# Patient Record
Sex: FEMALE | Race: OTHER | HISPANIC OR LATINO | Employment: UNEMPLOYED | URBAN - METROPOLITAN AREA
[De-identification: names, ages, dates, MRNs, and addresses within clinical notes are randomized per-mention and may not be internally consistent; named-entity substitution may affect disease eponyms.]

---

## 2017-03-03 ENCOUNTER — HOSPITAL ENCOUNTER (EMERGENCY)
Facility: HOSPITAL | Age: 27
Discharge: HOME | End: 2017-03-03
Attending: EMERGENCY MEDICINE

## 2017-03-03 VITALS
TEMPERATURE: 99 F | SYSTOLIC BLOOD PRESSURE: 120 MMHG | RESPIRATION RATE: 20 BRPM | DIASTOLIC BLOOD PRESSURE: 82 MMHG | WEIGHT: 154 LBS | HEIGHT: 63 IN | HEART RATE: 80 BPM | BODY MASS INDEX: 27.29 KG/M2 | OXYGEN SATURATION: 99 %

## 2017-03-03 DIAGNOSIS — R10.2 PELVIC PAIN AFFECTING PREGNANCY IN FIRST TRIMESTER, ANTEPARTUM: ICD-10-CM

## 2017-03-03 DIAGNOSIS — O34.01 SEPTATE UTERUS AFFECTING PREGNANCY IN FIRST TRIMESTER: ICD-10-CM

## 2017-03-03 DIAGNOSIS — Q51.28 SEPTATE UTERUS AFFECTING PREGNANCY IN FIRST TRIMESTER: ICD-10-CM

## 2017-03-03 DIAGNOSIS — Z3A.01 LESS THAN 8 WEEKS GESTATION OF PREGNANCY: Primary | ICD-10-CM

## 2017-03-03 DIAGNOSIS — O26.891 PELVIC PAIN AFFECTING PREGNANCY IN FIRST TRIMESTER, ANTEPARTUM: ICD-10-CM

## 2017-03-03 LAB
ALBUMIN SERPL BCP-MCNC: 3.8 G/DL
ALP SERPL-CCNC: 71 U/L
ALT SERPL W/O P-5'-P-CCNC: 14 U/L
ANION GAP SERPL CALC-SCNC: 9 MMOL/L
AST SERPL-CCNC: 14 U/L
B-HCG UR QL: POSITIVE
BACTERIA GENITAL QL WET PREP: ABNORMAL
BASOPHILS # BLD AUTO: 0.02 K/UL
BASOPHILS NFR BLD: 0.3 %
BILIRUB SERPL-MCNC: 0.7 MG/DL
BILIRUB UR QL STRIP: NEGATIVE
BUN SERPL-MCNC: 10 MG/DL
CALCIUM SERPL-MCNC: 9.5 MG/DL
CHLORIDE SERPL-SCNC: 105 MMOL/L
CLARITY UR: CLEAR
CLUE CELLS VAG QL WET PREP: ABNORMAL
CO2 SERPL-SCNC: 21 MMOL/L
COLOR UR: YELLOW
CREAT SERPL-MCNC: 0.7 MG/DL
CTP QC/QA: YES
DIFFERENTIAL METHOD: NORMAL
EOSINOPHIL # BLD AUTO: 0.1 K/UL
EOSINOPHIL NFR BLD: 1.6 %
ERYTHROCYTE [DISTWIDTH] IN BLOOD BY AUTOMATED COUNT: 12.4 %
EST. GFR  (AFRICAN AMERICAN): >60 ML/MIN/1.73 M^2
EST. GFR  (NON AFRICAN AMERICAN): >60 ML/MIN/1.73 M^2
FILAMENT FUNGI VAG WET PREP-#/AREA: ABNORMAL
GLUCOSE SERPL-MCNC: 99 MG/DL
GLUCOSE UR QL STRIP: NEGATIVE
HCG INTACT+B SERPL-ACNC: NORMAL MIU/ML
HCT VFR BLD AUTO: 39.4 %
HGB BLD-MCNC: 13.5 G/DL
HGB UR QL STRIP: ABNORMAL
KETONES UR QL STRIP: NEGATIVE
LEUKOCYTE ESTERASE UR QL STRIP: NEGATIVE
LYMPHOCYTES # BLD AUTO: 2.4 K/UL
LYMPHOCYTES NFR BLD: 34.1 %
MCH RBC QN AUTO: 29.5 PG
MCHC RBC AUTO-ENTMCNC: 34.3 %
MCV RBC AUTO: 86 FL
MONOCYTES # BLD AUTO: 0.5 K/UL
MONOCYTES NFR BLD: 6.9 %
NEUTROPHILS # BLD AUTO: 4 K/UL
NEUTROPHILS NFR BLD: 56.7 %
NITRITE UR QL STRIP: NEGATIVE
PH UR STRIP: 7 [PH] (ref 5–8)
PLATELET # BLD AUTO: 262 K/UL
PMV BLD AUTO: 10.9 FL
POTASSIUM SERPL-SCNC: 3.8 MMOL/L
PROT SERPL-MCNC: 7.1 G/DL
PROT UR QL STRIP: NEGATIVE
RBC # BLD AUTO: 4.58 M/UL
SODIUM SERPL-SCNC: 135 MMOL/L
SP GR UR STRIP: 1.01 (ref 1–1.03)
SPECIMEN SOURCE: ABNORMAL
T VAGINALIS GENITAL QL WET PREP: ABNORMAL
URN SPEC COLLECT METH UR: ABNORMAL
UROBILINOGEN UR STRIP-ACNC: NEGATIVE EU/DL
WBC # BLD AUTO: 6.96 K/UL
WBC #/AREA VAG WET PREP: ABNORMAL
YEAST GENITAL QL WET PREP: ABNORMAL

## 2017-03-03 PROCEDURE — 80053 COMPREHEN METABOLIC PANEL: CPT

## 2017-03-03 PROCEDURE — 87210 SMEAR WET MOUNT SALINE/INK: CPT

## 2017-03-03 PROCEDURE — 81003 URINALYSIS AUTO W/O SCOPE: CPT

## 2017-03-03 PROCEDURE — 81025 URINE PREGNANCY TEST: CPT | Performed by: PHYSICIAN ASSISTANT

## 2017-03-03 PROCEDURE — 99285 EMERGENCY DEPT VISIT HI MDM: CPT | Mod: 25

## 2017-03-03 PROCEDURE — 85025 COMPLETE CBC W/AUTO DIFF WBC: CPT

## 2017-03-03 PROCEDURE — 84702 CHORIONIC GONADOTROPIN TEST: CPT

## 2017-03-03 RX ORDER — ONDANSETRON 4 MG/1
4 TABLET, ORALLY DISINTEGRATING ORAL EVERY 8 HOURS PRN
Qty: 15 TABLET | Refills: 0 | Status: SHIPPED | OUTPATIENT
Start: 2017-03-03

## 2017-03-03 NOTE — ED NOTES
Patient has verified the spelling of their name and  on armband  LOC: The patient is awake, alert, and aware of environment with an appropriate affect, the patient is oriented x 3 and speaking appropriately.   APPEARANCE: Patient resting comfortably and in no acute distress, patient is clean and well groomed, patient's clothing is properly fastened.   SKIN: The skin is warm and dry, color consistent with ethnicity, patient has normal skin turgor and moist mucus membranes, skin intact, no breakdown or bruising noted.   :   Voids without difficulty  MUSCULOSKELETAL: Patient moving all extremities spontaneously, no obvious swelling or deformities noted.   RESPIRATORY: Airway is open and patent, respirations are spontaneous, patient has a normal effort and rate, no accessory muscle use noted, bilateral breath sounds clear, denies SOB   ABDOMEN: Soft and non tender to palpation, no distention noted, normoactive bowel sounds present in all four quadrants.   CARDIAC:  Normal rate and rhythm, no peripheral edema noted, less then 3 second capillary refill, denies chest pain  COMPLAINT: left lower abdominal quadrant pain which she rates 5 out of 10, 4 weeks pregnant and denies vaginal bleeding or discharge - , nausea

## 2017-03-03 NOTE — ED AVS SNAPSHOT
[facility]  [address]  [place] [address]               Lexis Yousif   3/3/2017  7:53 AM   ED    Descripción:  Female : 1990   Departamento:  [facility]           Santos Cuidado fue coordinado por:     Provider Role From To    Leigha Albert MD Attending Provider 17 0801 --    SHONDA Arias Physician Assistant 17 8153 --      Razón de la viviane     Abdominal Pain           Diagnósticos de Esta Visita        Comentarios    Less than 8 weeks gestation of pregnancy    -  Primario     Pelvic pain affecting pregnancy in first trimester, antepartum         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 - Oral    ondansetron (ZOFRAN-ODT) 4 MG TbDL 15 tablet 0 3/3/2017     Take 1 tablet (4 mg total) by mouth every 8 (eight) hours as needed. - Oral      [facility]     [facility]fermeras - Asistencia   Enfermeras registradas de [facility] pueden ayudarle a reservar vicky viviane, proveer educación para la meg, asesoría clínica, y otros servicios de asesoramiento.   Llame para braden servicio gratuito a [phone].             Medicamentos           Mensaje sobre Medicamentos     Verificar los cambios y / o adiciones a santos régimen de medicación son los mismos que discutir con santos médico. Si cualquiera de estos cambios o adiciones son incorrectos, por favor  "notifique a jaime proveedor de atención médica.        EMPEZAR a krupa estos medicamentos NUEVOS        Refills    PNV cmb#21-iron-folic acid (PRENATAL COMPLETE)  mg-mcg Tab 0    Sig: Take 1 tablet by mouth once daily.    Categoría: Print    Vía: Oral    ondansetron (ZOFRAN-ODT) 4 MG TbDL 0    Sig: Take 1 tablet (4 mg total) by mouth every 8 (eight) hours as needed.    Categoría: Print    Vía: Oral           Verifique que la siguiente lista de medicamentos es vicky representación exacta de los medicamentos que está tomando actualmente. Si no hay ningunos reportados, la lista puede estar en stiles. Si no es correcta, por favor póngase en contacto con jaime proveedor de atención médica. Lleve esta lista con usted en martín de emergencia.           Medicamentos Actuales     ondansetron (ZOFRAN-ODT) 4 MG TbDL Take 1 tablet (4 mg total) by mouth every 8 (eight) hours as needed.    PNV cmb#21-iron-folic acid (PRENATAL COMPLETE)  mg-mcg Tab Take 1 tablet by mouth once daily.           Información de referencia clínica           Brandee signos vitales phan     PS Pulso Temperatura Resp [place] [employer]    126/76 70 98.8 °F (37.1 °C) (Oral) 20 5' 3" (1.6 m) 69.9 kg (154 lb)    Ultima menstruación SpO2 BMI ([facility])             01/14/2017 100% 27.28 kg/m2         Alergias     A partir del:  3/3/2017        No Known Allergies      Vacunas     Administradas en la fecha de la visita:  3/3/2017        None      ED Micro, Lab, POCT     Start Ordered       Status Ordering Provider    03/03/17 0802 03/03/17 0802  hCG, quantitative  STAT      Final result     03/03/17 0802 03/03/17 0802  Vaginal Screen  STAT      Final result     03/03/17 0802 03/03/17 0802  CBC auto differential  Once      Final result     03/03/17 0802 03/03/17 0802  Comprehensive metabolic panel  Once      In process     03/03/17 0800 03/03/17 0759  Urinalysis  STAT      Final result     03/03/17 0800 03/03/17 0759  POCT urine pregnancy  Once      Final result       ED " Imaging Orders     Start Ordered       Status Ordering Provider    17 0808 17 0802  US OB Less Than 14 Wks with Transvaginal (xpd)  1 time imaging      Final result     17 0802 17 0802    1 time imaging,   Status:  Canceled      Canceled       Referencias/Adjuntos de lionel     ABDOMINAL PAIN, EARLY PREGNANCY (Ukrainian)      Registrarse para [facility]     La activación de jaime cuenta [facility] es tan fácil sandhya 1-2-3!    1) Ir a my.[email or web address], seleccione Registrarse Ahora, meter el código de activación y jaime fecha de nacimiento, y seleccione Próximo.    [id number]  Expires: 2017 10:47 AM      2) Crear un nombre de usuario y contraseña para usar cuando se visita [facility] en el futuro y selecciona vicky pregunta de seguridad en martín de que pierda jaime contraseña y seleccione Próximo.    3) Introduzca jaime dirección de correo electrónico y gilmer clic en Registrarse!    Información Adicional  Si tiene alguna pregunta, por favor, e-mail [email or web address] o llame al [phone] para hablar con nuestro personal. Recuerde, [facility] no debe ser usada para necesidades urgentes. En martín de emergencia médica, llame al [phone].         [facility] cumple con las leyes federales aplicables de derechos civiles y no discrimina por motivos de angie, color, origen nacional, edad, discapacidad, o sexo.        Language Assistance Services     ATTENTION: Language assistance services are available, free of charge. Please call [phone].      ATENCIÓN: Si habla español, tiene a jaime disposición servicios gratuitos de asistencia lingüística. Llame al [phone].     CHÚ Ý: N?u b?n nói Ti?ng Vi?t, có các d?ch v? h? tr? ngôn ng? mi?n phí dành cho b?n. G?i s? [phone].                      [facility] [address]  [place] [address]               Lexis Yousif   3/3/2017  7:53 AM   ED    Description:  Female : 1990   [facility]           Your Care was Coordinated By:     Provider Role From To    Leigha Albert MD Attending Provider 03/03/17 0801 --    SHONDA Arias Physician Assistant 03/03/17 6351 --      Reason for Visit     Abdominal Pain           Diagnoses this Visit        Comments    Less than 8 weeks gestation of pregnancy    -  Primary     Pelvic pain affecting pregnancy in first trimester, antepartum         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    Specialties:  Obstetrics, Obstetrics and Gynecology    Why:  for HCG and US    Contact information:    [address]  [address]  [place] [address]  [phone]         These Medications        Disp Refills Start End    PNV cmb#21-iron-folic acid (PRENATAL COMPLETE)  mg-mcg Tab 30 tablet 0 3/3/2017 3/3/2018    Take 1 tablet by mouth once daily. - Oral    ondansetron (ZOFRAN-ODT) 4 MG TbDL 15 tablet 0 3/3/2017     Take 1 tablet (4 mg total) by mouth every 8 (eight) hours as needed. - Oral      [facility]     [facility] On Call Nurse Care Line - 24/7 Assistance  Registered nurses in the [facility] On Call Center provide clinical advisement, health education, appointment booking, and other advisory services.  Call for this free service at [phone].             Medications           Message regarding Medications     Verify the changes and/or additions to your medication regime listed below are the same as discussed with your clinician today.  If any of these changes or additions are incorrect, please notify your healthcare provider.        START taking these NEW medications        Refills    PNV cmb#21-iron-folic acid (PRENATAL COMPLETE)  mg-mcg Tab 0    Sig: Take 1 tablet by mouth once daily.    Class: Print    Route: Oral    ondansetron (ZOFRAN-ODT) 4 MG TbDL 0    Sig: Take  "1 tablet (4 mg total) by mouth every 8 (eight) hours as needed.    Class: Print    Route: Oral           Verify that the below list of medications is an accurate representation of the medications you are currently taking.  If none reported, the list may be blank. If incorrect, please contact your healthcare provider. Carry this list with you in case of emergency.           Current Medications     ondansetron (ZOFRAN-ODT) 4 MG TbDL Take 1 tablet (4 mg total) by mouth every 8 (eight) hours as needed.    PNV cmb#21-iron-folic acid (PRENATAL COMPLETE)  mg-mcg Tab Take 1 tablet by mouth once daily.           Clinical Reference Information           Your Vitals Were     BP Pulse Temp Resp Height Weight    126/76 70 98.8 °F (37.1 °C) (Oral) 20 5' 3" (1.6 m) 69.9 kg (154 lb)    Last Period SpO2 BMI             01/14/2017 100% 27.28 kg/m2         Allergies as of 3/3/2017     No Known Allergies      Immunizations Administered on Date of Encounter - 3/3/2017     None      ED Micro, Lab, POCT     Start Ordered       Status Ordering Provider    03/03/17 0802 03/03/17 0802  hCG, quantitative  STAT      Final result     03/03/17 0802 03/03/17 0802  Vaginal Screen  STAT      Final result     03/03/17 0802 03/03/17 0802  CBC auto differential  Once      Final result     03/03/17 0802 03/03/17 0802  Comprehensive metabolic panel  Once      In process     03/03/17 0800 03/03/17 0759  Urinalysis  STAT      Final result     03/03/17 0800 03/03/17 0759  POCT urine pregnancy  Once      Final result       ED Imaging Orders     Start Ordered       Status Ordering Provider    03/03/17 0808 03/03/17 0802  US OB Less Than 14 Wks with Transvaginal (xpd)  1 time imaging      Final result     03/03/17 0802 03/03/17 0802    1 time imaging,   Status:  Canceled      Canceled       Discharge References/Attachments     ABDOMINAL PAIN, EARLY PREGNANCY (Estonian)      [facility] Sign-Up     Activating your [facility] account is as easy as 1-2-3! "     1) Visit [email or web address], select Sign Up Now, enter this activation code and your date of birth, then select Next.  [email or web address]  Expires: 4/17/2017 10:47 AM      2) Create a username and password to use when you visit [facility] in the future and select a security question in case you lose your password and select Next.    3) Enter your e-mail address and click Sign Up!    Additional Information  If you have questions, please e-mail [email or web address] or call [phone] to talk to our [facility] staff. Remember, [facility] is NOT to be used for urgent needs. For medical emergencies, dial 911.          [facility] complies with applicable Federal civil rights laws and does not discriminate on the basis of race, color, national origin, age, disability, or sex.        Language Assistance Services     ATTENTION: Language assistance services are available, free of charge. Please call [phone].      ATENCIÓN: Si habla español, tiene a jaime disposición servicios gratuitos de asistencia lingüística. Llame al [phone].     CHÚ Ý: N?u b?n nói Ti?ng Vi?t, có các d?ch v? h? tr? ngôn ng? mi?n phí dành cho b?n. G?i s? [phone].

## 2017-03-03 NOTE — ED PROVIDER NOTES
Encounter Date: 3/3/2017       History     Chief Complaint   Patient presents with    Abdominal Pain     LLQ pain x3 weeks. Pt found out that she is pregnant 1 week ago. Denies any vaginal bleeding. LMP      Review of patient's allergies indicates:  No Known Allergies  HPI Comments:   Lexis Yousif 26 y.o. nontoxic/afebrile female that is  with confirmed UPT at approximately 6 weeks gestation (LMP was 17) presented to the ED with c/o left pelvic pain for the past three weeks. She reports that the pain waxes and wanes in intensity with no exacerbating or mitigating factors. She states that the pain is a cramping sensation.  She denies any fever, chills, vomiting, diarrhea, constipation, dysuria, hematuria, vaginal bleeding or vaginal discharge. She denies taking any medications for the symptoms.     The history is provided by the patient. The history is limited by a language barrier. A  was used.     History reviewed. No pertinent past medical history.  History reviewed. No pertinent surgical history.  History reviewed. No pertinent family history.  Social History   Substance Use Topics    Smoking status: Never Smoker    Smokeless tobacco: None    Alcohol use No     Review of Systems   Constitutional: Negative for activity change, appetite change, chills and fever.   HENT: Positive for congestion. Negative for sore throat.    Respiratory: Negative for cough and shortness of breath.    Cardiovascular: Negative for chest pain.   Gastrointestinal: Positive for nausea. Negative for abdominal pain, constipation, diarrhea and vomiting.   Endocrine: Negative for polydipsia.   Genitourinary: Positive for frequency and pelvic pain. Negative for decreased urine volume, dyspareunia, dysuria, flank pain, hematuria, vaginal bleeding and vaginal discharge.   Musculoskeletal: Negative for back pain.   Skin: Negative for rash.   Neurological: Negative for dizziness, weakness,  light-headedness and numbness.   Hematological: Does not bruise/bleed easily.       Physical Exam   Initial Vitals   BP Pulse Resp Temp SpO2   03/03/17 0751 03/03/17 0751 03/03/17 0751 03/03/17 0751 03/03/17 0751   131/79 100 16 98.8 °F (37.1 °C) 100 %     Physical Exam    Nursing note and vitals reviewed.  Constitutional: Vital signs are normal. She appears well-developed and well-nourished. She is cooperative.  Non-toxic appearance. She does not appear ill. No distress.   HENT:   Head: Normocephalic and atraumatic.   Eyes: Conjunctivae and lids are normal.   Neck: Neck supple. No rigidity.   Cardiovascular: Normal rate and regular rhythm.   Pulmonary/Chest: Breath sounds normal. No respiratory distress. She has no wheezes. She has no rhonchi.   Abdominal: Soft. Normal appearance and bowel sounds are normal. There is no tenderness. There is no rigidity, no rebound and no guarding.   Genitourinary: Pelvic exam was performed with patient supine. Uterus is deviated and enlarged. Uterus is not tender. Cervix exhibits no motion tenderness and no discharge. No erythema or bleeding in the vagina. No vaginal discharge found.   Genitourinary Comments: cervical Os closed   Musculoskeletal: Normal range of motion.   Neurological: She is alert and oriented to person, place, and time. She has normal strength. GCS eye subscore is 4. GCS verbal subscore is 5. GCS motor subscore is 6.   Skin: Skin is warm, dry and intact. No rash noted.   Psychiatric: She has a normal mood and affect. Her speech is normal and behavior is normal. Thought content normal.         ED Course   Procedures  Labs Reviewed   POCT URINE PREGNANCY - Abnormal; Notable for the following:        Result Value    POC Preg Test, Ur Positive (*)     All other components within normal limits   URINALYSIS   HCG, QUANTITATIVE, PREGNANCY   VAGINAL SCREEN   CBC W/ AUTO DIFFERENTIAL   COMPREHENSIVE METABOLIC PANEL       Lexis Yousif 26 y.o. nontoxic/afebrile  female that is  with confirmed UPT at approximately 6 weeks gestation (LMP was 17) presented to the ED with c/o left pelvic pain for the past three weeks. She reports that the pain waxes and wanes in intensity with no exacerbating or mitigating factors. She states that the pain is a cramping sensation.  She denies any fever, chills, vomiting, diarrhea, constipation, dysuria, hematuria, vaginal bleeding or vaginal discharge. She denies taking any medications for the symptoms.  ROS positive for left pelvic pain.  Physical exam reveals patient that is well appearing in no obvious distress. Mucous membranes moist. Lungs clear, heart regular rate and rhythm. Abdomen is soft and nontender with no rebound or rigidity. Pelvic reveals no CMT ,discharge or uterus tenderness of the right side. She did have some discomfort of the left adnexa with no fullness. Spetate uterus on exam    DDX: UTI, pregnancy, Ectopic, cervicitis    ED management: labs with no [place] [address], US showing septate uterus and unknown pregnancy location. We informed patient of both and she verbalized known of the spetate uterus and that she will see OB Monday for repeat HCG and US. Tylenol as needed for pain and will increase fluids. No empiric G/C treatment as low suspicion of etiology of pain at this time.    Impression/Plan: The primary encounter diagnosis was Less than 8 weeks gestation of pregnancy. 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 Discharged with Zofran and prenatal. Patient will follow up with Primary.  Patient cautioned on when to return to ED.  Pt. Understands and agrees with current treatment plan                           ED Course     Clinical Impression:   The primary encounter diagnosis was Less than 8 weeks gestation of pregnancy. 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          SHONDA Arias  03/04/17 6816

## 2021-01-15 ENCOUNTER — CLINICAL SUPPORT (OUTPATIENT)
Dept: URGENT CARE | Facility: CLINIC | Age: 31
End: 2021-01-15
Payer: COMMERCIAL

## 2021-01-15 DIAGNOSIS — Z78.9 NO KNOWN HEALTH PROBLEMS: Primary | ICD-10-CM

## 2021-01-15 LAB
CTP QC/QA: YES
SARS-COV-2 RDRP RESP QL NAA+PROBE: NEGATIVE

## 2021-01-15 PROCEDURE — 99211 OFF/OP EST MAY X REQ PHY/QHP: CPT | Mod: S$GLB,,, | Performed by: NURSE PRACTITIONER

## 2021-01-15 PROCEDURE — 99211 PR OFFICE/OUTPT VISIT, EST, LEVL I: ICD-10-PCS | Mod: S$GLB,,, | Performed by: NURSE PRACTITIONER

## 2021-01-15 PROCEDURE — U0002: ICD-10-PCS | Mod: QW,S$GLB,, | Performed by: NURSE PRACTITIONER

## 2021-01-15 PROCEDURE — U0002 COVID-19 LAB TEST NON-CDC: HCPCS | Mod: QW,S$GLB,, | Performed by: NURSE PRACTITIONER

## 2021-04-16 ENCOUNTER — PATIENT MESSAGE (OUTPATIENT)
Dept: RESEARCH | Facility: HOSPITAL | Age: 31
End: 2021-04-16

## 2023-09-23 ENCOUNTER — HOSPITAL ENCOUNTER (EMERGENCY)
Facility: HOSPITAL | Age: 33
Discharge: HOME | End: 2023-09-23
Attending: EMERGENCY MEDICINE

## 2023-09-23 VITALS
TEMPERATURE: 98 F | SYSTOLIC BLOOD PRESSURE: 133 MMHG | DIASTOLIC BLOOD PRESSURE: 100 MMHG | HEIGHT: 67 IN | BODY MASS INDEX: 28.25 KG/M2 | WEIGHT: 180 LBS | OXYGEN SATURATION: 97 % | RESPIRATION RATE: 20 BRPM | HEART RATE: 99 BPM

## 2023-09-23 DIAGNOSIS — J02.0 STREP PHARYNGITIS: Primary | ICD-10-CM

## 2023-09-23 LAB
GROUP A STREP, MOLECULAR: POSITIVE
SARS-COV-2 RDRP RESP QL NAA+PROBE: NEGATIVE

## 2023-09-23 PROCEDURE — 25000003 PHARM REV CODE 250: Performed by: EMERGENCY MEDICINE

## 2023-09-23 PROCEDURE — 87651 STREP A DNA AMP PROBE: CPT | Performed by: EMERGENCY MEDICINE

## 2023-09-23 PROCEDURE — 96372 THER/PROPH/DIAG INJ SC/IM: CPT | Performed by: EMERGENCY MEDICINE

## 2023-09-23 PROCEDURE — 63600175 PHARM REV CODE 636 W HCPCS: Performed by: EMERGENCY MEDICINE

## 2023-09-23 PROCEDURE — 99284 EMERGENCY DEPT VISIT MOD MDM: CPT

## 2023-09-23 PROCEDURE — U0002 COVID-19 LAB TEST NON-CDC: HCPCS | Performed by: EMERGENCY MEDICINE

## 2023-09-23 RX ORDER — DEXAMETHASONE SODIUM PHOSPHATE 10 MG/ML
10 INJECTION INTRAMUSCULAR; INTRAVENOUS
Status: COMPLETED | OUTPATIENT
Start: 2023-09-23 | End: 2023-09-23

## 2023-09-23 RX ORDER — ACETAMINOPHEN 500 MG
1000 TABLET ORAL
Status: COMPLETED | OUTPATIENT
Start: 2023-09-23 | End: 2023-09-23

## 2023-09-23 RX ORDER — TRAMADOL HYDROCHLORIDE 50 MG/1
50 TABLET ORAL EVERY 6 HOURS PRN
Qty: 15 TABLET | Refills: 0 | Status: SHIPPED | OUTPATIENT
Start: 2023-09-23

## 2023-09-23 RX ORDER — IBUPROFEN 400 MG/1
800 TABLET ORAL
Status: COMPLETED | OUTPATIENT
Start: 2023-09-23 | End: 2023-09-23

## 2023-09-23 RX ORDER — LIDOCAINE HYDROCHLORIDE 10 MG/ML
1 INJECTION INFILTRATION; PERINEURAL
Status: COMPLETED | OUTPATIENT
Start: 2023-09-23 | End: 2023-09-23

## 2023-09-23 RX ORDER — CEFTRIAXONE 1 G/1
1 INJECTION, POWDER, FOR SOLUTION INTRAMUSCULAR; INTRAVENOUS
Status: COMPLETED | OUTPATIENT
Start: 2023-09-23 | End: 2023-09-23

## 2023-09-23 RX ORDER — AMOXICILLIN AND CLAVULANATE POTASSIUM 875; 125 MG/1; MG/1
1 TABLET, FILM COATED ORAL 2 TIMES DAILY
Qty: 20 TABLET | Refills: 0 | Status: SHIPPED | OUTPATIENT
Start: 2023-09-23 | End: 2023-10-03

## 2023-09-23 RX ADMIN — CEFTRIAXONE SODIUM 1 G: 1 INJECTION, POWDER, FOR SOLUTION INTRAMUSCULAR; INTRAVENOUS at 05:09

## 2023-09-23 RX ADMIN — DEXAMETHASONE SODIUM PHOSPHATE 10 MG: 10 INJECTION INTRAMUSCULAR; INTRAVENOUS at 05:09

## 2023-09-23 RX ADMIN — IBUPROFEN 800 MG: 400 TABLET, FILM COATED ORAL at 05:09

## 2023-09-23 RX ADMIN — LIDOCAINE HYDROCHLORIDE 1 ML: 10 INJECTION, SOLUTION INFILTRATION; PERINEURAL at 05:09

## 2023-09-23 RX ADMIN — ACETAMINOPHEN 1000 MG: 500 TABLET ORAL at 05:09

## 2023-09-23 NOTE — ED PROVIDER NOTES
Encounter Date: 9/23/2023       History     Chief Complaint   Patient presents with    Sore Throat     +erythema and swelling for the past week, but got worse around 0100     Pt here with ST and bodyaches onset around 1am. No cough or fever. +chills. No sick contacts.     The history is provided by the patient and a friend. The history is limited by a language barrier. A  was used.   Sore Throat   This is a new problem. The sore throat symptoms include sore throat.The current episode started 2 to 3 hours ago. The problem has been unchanged. There has been no fever. Pertinent negatives include no abdominal pain, congestion, coughing, diarrhea, drooling, ear discharge, ear pain, headaches, hoarse voice, plugged ear sensation, neck pain, shortness of breath, stridor, swollen glands, trouble swallowing or vomiting. She has tried nothing for the symptoms.     Review of patient's allergies indicates:  No Known Allergies  No past medical history on file.  No past surgical history on file.  No family history on file.  Social History     Tobacco Use    Smoking status: Never   Substance Use Topics    Alcohol use: No     Review of Systems   Constitutional:  Positive for chills.   HENT:  Positive for sore throat. Negative for congestion, drooling, ear discharge, ear pain, hoarse voice and trouble swallowing.    Respiratory:  Negative for cough, shortness of breath and stridor.    Gastrointestinal:  Negative for abdominal pain, diarrhea and vomiting.   Musculoskeletal:  Positive for myalgias. Negative for neck pain.   Neurological:  Negative for headaches.   All other systems reviewed and are negative.      Physical Exam     Initial Vitals [09/23/23 0511]   BP Pulse Resp Temp SpO2   (!) 133/100 99 20 98.2 °F (36.8 °C) 97 %      MAP       --         Physical Exam    Nursing note and vitals reviewed.  Constitutional: She appears well-developed and well-nourished. She is not diaphoretic. No distress.   HENT:    Head: Normocephalic and atraumatic.   Nose: Nose normal.   OP with mild erythema and swelling. No exudate.    Eyes: Conjunctivae and EOM are normal. No scleral icterus.   Neck: Neck supple.   Normal range of motion.  Cardiovascular:  Normal rate, regular rhythm, normal heart sounds and intact distal pulses.           Pulmonary/Chest: Breath sounds normal.   Abdominal: Abdomen is soft. There is no abdominal tenderness.   Musculoskeletal:         General: No tenderness or edema. Normal range of motion.      Cervical back: Normal range of motion and neck supple.     Lymphadenopathy:     She has cervical adenopathy.   Neurological: She is alert and oriented to person, place, and time. GCS score is 15. GCS eye subscore is 4. GCS verbal subscore is 5. GCS motor subscore is 6.   Skin: Skin is warm and dry. Capillary refill takes less than 2 seconds. No rash noted. No erythema. No pallor.   Psychiatric: She has a normal mood and affect.         ED Course   Procedures  Labs Reviewed   GROUP A STREP, MOLECULAR - Abnormal; Notable for the following components:       Result Value    Group A Strep, Molecular Positive (*)     All other components within normal limits   SARS-COV-2 RNA AMPLIFICATION, QUAL          Imaging Results    None          Medications   cefTRIAXone injection 1 g (has no administration in time range)   LIDOcaine HCL 10 mg/ml (1%) injection 1 mL (has no administration in time range)   dexAMETHasone sodium phos (PF) injection 10 mg (10 mg Intramuscular Given 9/23/23 0515)   acetaminophen tablet 1,000 mg (1,000 mg Oral Given 9/23/23 0515)   ibuprofen tablet 800 mg (800 mg Oral Given 9/23/23 0515)     Medical Decision Making  Pt presented with ST and bodyaches. Exam c/w strep pharyngitis. Swab positive. Covid neg. Rocephin and decadron given. Rx augmentin. PCP f/u next week.     Amount and/or Complexity of Data Reviewed  Labs: ordered. Decision-making details documented in ED Course.    Risk  OTC  drugs.  Prescription drug management.                               Clinical Impression:   Final diagnoses:  [J02.0] Strep pharyngitis (Primary)        ED Disposition Condition    Discharge Stable          ED Prescriptions       Medication Sig Dispense Start Date End Date Auth. Provider    amoxicillin-clavulanate 875-125mg (AUGMENTIN) 875-125 mg per tablet Take 1 tablet by mouth 2 (two) times daily. for 10 days 20 tablet 9/23/2023 10/3/2023 Scottie Watts Jr., MD    traMADoL (ULTRAM) 50 mg tablet Take 1 tablet (50 mg total) by mouth every 6 (six) hours as needed for Pain. 15 tablet 9/23/2023 -- Scottie Watts Jr., MD          Follow-up Information       Follow up With Specialties Details Why Contact Info    primary care clinic  Schedule an appointment as soon as possible for a visit                Scottie Watts Jr., MD  09/23/23 8329